# Patient Record
Sex: MALE | Race: WHITE | Employment: FULL TIME | URBAN - NONMETROPOLITAN AREA
[De-identification: names, ages, dates, MRNs, and addresses within clinical notes are randomized per-mention and may not be internally consistent; named-entity substitution may affect disease eponyms.]

---

## 2021-02-17 ENCOUNTER — HOSPITAL ENCOUNTER (EMERGENCY)
Age: 46
Discharge: HOME | End: 2021-02-17
Attending: EMERGENCY MEDICINE
Payer: COMMERCIAL

## 2021-02-17 ENCOUNTER — APPOINTMENT (OUTPATIENT)
Dept: GENERAL RADIOLOGY | Age: 46
End: 2021-02-17
Payer: COMMERCIAL

## 2021-02-17 VITALS
TEMPERATURE: 99.2 F | SYSTOLIC BLOOD PRESSURE: 158 MMHG | RESPIRATION RATE: 16 BRPM | HEIGHT: 74 IN | BODY MASS INDEX: 40.43 KG/M2 | DIASTOLIC BLOOD PRESSURE: 84 MMHG | HEART RATE: 87 BPM | WEIGHT: 315 LBS | OXYGEN SATURATION: 94 %

## 2021-02-17 DIAGNOSIS — S93.401A SPRAIN OF RIGHT ANKLE, UNSPECIFIED LIGAMENT, INITIAL ENCOUNTER: Primary | ICD-10-CM

## 2021-02-17 PROCEDURE — 99284 EMERGENCY DEPT VISIT MOD MDM: CPT

## 2021-02-17 PROCEDURE — 73610 X-RAY EXAM OF ANKLE: CPT

## 2021-02-17 PROCEDURE — 73630 X-RAY EXAM OF FOOT: CPT

## 2021-02-17 PROCEDURE — 73590 X-RAY EXAM OF LOWER LEG: CPT

## 2021-02-17 ASSESSMENT — PAIN SCALES - GENERAL: PAINLEVEL_OUTOF10: 10

## 2021-02-17 ASSESSMENT — PAIN DESCRIPTION - LOCATION: LOCATION: ANKLE

## 2021-02-17 ASSESSMENT — PAIN DESCRIPTION - FREQUENCY: FREQUENCY: CONTINUOUS

## 2021-02-17 NOTE — ED PROVIDER NOTES
CHIEF COMPLAINT    Chief Complaint   Patient presents with    Ankle Pain     pt states he stepped in a hole on Monday and hurts really bad     HPI  Gia Barrett is a 50 y.o. male who presents to the ED with complaints of right ankle pain and swelling. Patient states that 2 days ago he stepped into a hole and rolled his ankle. Since then he has been experiencing pain and swelling diffusely throughout the ankle but worst in the left lateral malleolus with radiation to the fifth metatarsal and fibula. Pain describes a throbbing stabbing pain that is constant and rated as severe. The pain is exacerbated with certain movements. He has been ambulatory on the ankle but states the pain became worse today. No prior injury to this foot or ankle in the past.  Denies numbness, tingling, nausea, vomiting, dizziness, lightheadedness. REVIEW OF SYSTEMS  Constitutional: No fever, chills or recent illness. Eye: No visual changes  HENT: No earache or sore throat. Resp: No SOB or productive cough. Cardio: No chest pain or palpitations. GI: No abdominal pain, nausea, vomiting, constipation or diarrhea. No melena. : No dysuria, urgency or frequency. Endocrine: No heat intolerance, no cold intolerance, no polydipsia   Lymphatics: No adenopathy  Musculoskeletal: Complains of right ankle pain  Neuro: No headaches. Psych: No homicidal or suicidal thoughts  Skin: No rash, No itching. ?  ? PAST MEDICAL HISTORY  No past medical history on file. FAMILY HISTORY  No family history on file.   SOCIAL HISTORY  Social History     Socioeconomic History    Marital status:      Spouse name: Not on file    Number of children: Not on file    Years of education: Not on file    Highest education level: Not on file   Occupational History    Not on file   Social Needs    Financial resource strain: Not on file    Food insecurity     Worry: Not on file     Inability: Not on file    Transportation needs     Medical: Not on Patient with edema to medial malleolus and lateral malleolus of right ankle with tenderness to palpation of the right lateral malleolus. He has tenderness palpation of the proximal fifth metatarsal as well. Full range of motion to plantar flexion dorsiflexion of the right ankle. Mild tenderness to palpation of the distal right fibula. Compartments of the right lower extremity are soft and compressible. No pain to palpation of right knee. Neurologic: Alert & oriented x 3, No focal deficits noted. EKG  NA  RADIOLOGY  Labs Reviewed - No data to display  I personally reviewed the images. The radiologist's interpretation reveals:  Last Imaging results   XR TIBIA FIBULA RIGHT (2 VIEWS)   Final Result   Right tibia and fibula: No acute bony abnormality detected. Right ankle: Circumferential soft tissue swelling. No acute bony   abnormality. No dislocation. Right foot: No acute abnormality detected. XR FOOT RIGHT (MIN 3 VIEWS)   Final Result   Right tibia and fibula: No acute bony abnormality detected. Right ankle: Circumferential soft tissue swelling. No acute bony   abnormality. No dislocation. Right foot: No acute abnormality detected. XR ANKLE RIGHT (MIN 3 VIEWS)   Final Result   Right tibia and fibula: No acute bony abnormality detected. Right ankle: Circumferential soft tissue swelling. No acute bony   abnormality. No dislocation. Right foot: No acute abnormality detected. Procedures  NA  MEDS GIVEN IN ED:  Medications - No data to display  [address] [facility]  52-year-old male presents emergency department with complaints of right ankle pain as well as foot pain after stepping in a hole 2 days ago. On exam he has swelling to the medial and lateral malleolus of the right ankle with tenderness to palpation of the right lateral malleolus. He has pain to palpation of the right fifth metatarsal as well as distal right fibula as well. No pain to palpation of the right knee. Right lower extremity is neurovascular intact. At this time we will obtain x-rays of the right foot, right ankle, and right fibula. X-rays are negative for acute osseous injury. At this time patient provided with ankle brace and discharged home. Instructed to follow-up with primary care provider and use NSAIDs and ice as needed. Return precautions provided. Appropriate PPE utilized as indicated for entire patient encounter? Time of Disposition: See timeline  ? New Prescriptions    No medications on file     FINAL IMPRESSION  1. Sprain of right ankle, unspecified ligament, initial encounter        Electronically signed by:  Yaquelin Gatica DO, 2/17/2021         Yaquelin Gatica DO  02/17/21 2013

## 2021-02-18 NOTE — ED NOTES
Pt discharged with instructions,  pt stated understanding. Pt walked out of the ER using his own crutches.       Remy Reed RN  02/17/21 2029

## 2021-06-18 ENCOUNTER — HOSPITAL ENCOUNTER (OUTPATIENT)
Dept: MRI IMAGING | Age: 46
Discharge: HOME | End: 2021-06-18

## 2021-06-18 DIAGNOSIS — R52 PAIN: ICD-10-CM

## 2021-06-18 PROCEDURE — 73721 MRI JNT OF LWR EXTRE W/O DYE: CPT
